# Patient Record
Sex: FEMALE | Race: WHITE | NOT HISPANIC OR LATINO | Employment: FULL TIME | URBAN - METROPOLITAN AREA
[De-identification: names, ages, dates, MRNs, and addresses within clinical notes are randomized per-mention and may not be internally consistent; named-entity substitution may affect disease eponyms.]

---

## 2024-10-17 ENCOUNTER — OFFICE VISIT (OUTPATIENT)
Dept: OBSTETRICS AND GYNECOLOGY | Facility: CLINIC | Age: 24
End: 2024-10-17

## 2024-10-17 VITALS — HEART RATE: 75 BPM | DIASTOLIC BLOOD PRESSURE: 77 MMHG | SYSTOLIC BLOOD PRESSURE: 121 MMHG | WEIGHT: 154 LBS

## 2024-10-17 DIAGNOSIS — N63.0 BREAST MASS IN FEMALE: Primary | ICD-10-CM

## 2024-10-17 PROCEDURE — 99204 OFFICE O/P NEW MOD 45 MIN: CPT | Performed by: OBSTETRICS & GYNECOLOGY

## 2024-10-17 NOTE — PROGRESS NOTES
Magali Lea is a 23 y.o. female who presents with a chief complaint of Breast Mass (Breast lump)      SUBJECTIVE  Patient presents complaining of a right-sided breast lump.  Within the tail of the breast.  It hurts more during her periods.  She has felt it for about 5 months.  She has no breast cancer in her family.  She has no discharge from her nipple.    No past medical history on file.  No past surgical history on file.  Social History     Socioeconomic History    Marital status: Single     No family history on file.    OB History   No obstetric history on file.       OBJECTIVE  No Known Allergies   (Not in a hospital admission)       Review of Systems  History obtained from the patient  General ROS: negative  Psychological ROS: negative  Gastrointestinal ROS: no abdominal pain, change in bowel habits, or black or bloody stools  Musculoskeletal ROS: negative  Physical Exam  General Appearance: awake, alert, oriented, in no acute distress, well developed, well nourished, and in no acute distress  Skin: there are no suspicious lesions or rashes of concern  Head/Face: NCAT  Eyes: No gross abnormalities., PERRL, and EOMI  Neck: neck- supple, no mass, non-tender  Back: no pain to palpation  Breast: BREAST EXAM: mass in tail right breast. Feels like fibrocystic disease.   Abdomen: Soft, non-tender, normal bowel sounds; no bruits, organomegaly or masses.  Extremities: Extremities warm to touch, pink, with no edema.  Musculoskeletal: negative    /77   Pulse 75   Wt 69.9 kg (154 lb)    Problem List Items Addressed This Visit    None  Visit Diagnoses       Breast mass in female    -  Primary    Relevant Orders    BI US breast complete right         Increase vit E and decrease caffeine intake

## 2024-10-24 ENCOUNTER — APPOINTMENT (OUTPATIENT)
Dept: RADIOLOGY | Facility: CLINIC | Age: 24
End: 2024-10-24
Payer: COMMERCIAL